# Patient Record
Sex: FEMALE
[De-identification: names, ages, dates, MRNs, and addresses within clinical notes are randomized per-mention and may not be internally consistent; named-entity substitution may affect disease eponyms.]

---

## 2018-01-01 ENCOUNTER — HOSPITAL ENCOUNTER (EMERGENCY)
Dept: HOSPITAL 14 - H.ER | Age: 53
Discharge: HOME | End: 2018-01-01
Payer: SELF-PAY

## 2018-01-01 VITALS
OXYGEN SATURATION: 100 % | DIASTOLIC BLOOD PRESSURE: 105 MMHG | TEMPERATURE: 98.9 F | RESPIRATION RATE: 16 BRPM | HEART RATE: 108 BPM | SYSTOLIC BLOOD PRESSURE: 167 MMHG

## 2018-01-01 DIAGNOSIS — J32.9: Primary | ICD-10-CM

## 2018-01-01 PROCEDURE — 96375 TX/PRO/DX INJ NEW DRUG ADDON: CPT

## 2018-01-01 PROCEDURE — 99284 EMERGENCY DEPT VISIT MOD MDM: CPT

## 2018-01-01 PROCEDURE — 96374 THER/PROPH/DIAG INJ IV PUSH: CPT

## 2018-01-01 PROCEDURE — 81025 URINE PREGNANCY TEST: CPT

## 2018-01-01 PROCEDURE — 70450 CT HEAD/BRAIN W/O DYE: CPT

## 2018-01-01 NOTE — CT
PROCEDURE:  CT HEAD WITHOUT CONTRAST.



HISTORY:

headache



COMPARISON:

None available. 



TECHNIQUE:

Axial computed tomography images were obtained through the head/brain 

without intravenous contrast.  



Radiation dose:



Total exam DLP = 1007.76 mGy-cm.



This CT exam was performed using one or more of the following dose 

reduction techniques: Automated exposure control, adjustment of the 

mA and/or kV according to patient size, and/or use of iterative 

reconstruction technique.



FINDINGS:



HEMORRHAGE:

No intracranial hemorrhage. 



BRAIN:

No mass effect or edema.  No atrophy or chronic microvascular 

ischemic changes.Please note that MRI with diffusion imaging is more 

sensitive in the detection of acute ischemic event.



VENTRICLES:

No hydrocephalus. 



CALVARIUM:

Unremarkable.



PARANASAL SINUSES:

Mild mucosal thickening of the ethmoid and left maxillary sinuses; 

correlate clinically for chronic sinusitis.



MASTOID AIR CELLS:

Unremarkable as visualized. No inflammatory changes.



OTHER FINDINGS:

None.



IMPRESSION:

No acute intracranial pathology identified.  Mild mucosal thickening 

of the ethmoid and left maxillary sinuses ; correlate clinically for 

history of chronic sinusitis. 



Preliminary impression was provided by virtual radiologic.

## 2018-01-01 NOTE — ED PDOC
HPI:  Headache


Time Seen by Provider: 01/01/18 04:12


Chief Complaint (Nursing): Headache


Chief Complaint (Provider): headache


History Per: Patient


Additional Complaint(s): 


43 yo female, Reports she had an argument with family PTA, and that is when her 

headache started. Reports pain is in right side of her head. Reports she feels 

like "something burst". Tylenol taken 15 minutes PTA.





Past Medical History


Vital Signs: 





 Last Vital Signs











Temp  98.9 F   01/01/18 04:16


 


Pulse  108 H  01/01/18 04:16


 


Resp  16   01/01/18 04:16


 


BP  167/105 H  01/01/18 04:16


 


Pulse Ox  100   01/01/18 04:16














- Allergies


Allergies/Adverse Reactions: 


 Allergies











Allergy/AdvReac Type Severity Reaction Status Date / Time


 


No Known Allergies Allergy   Verified 01/01/18 04:16














- ECG


O2 Sat by Pulse Oximetry: 100





Disposition





- Disposition